# Patient Record
Sex: FEMALE | Employment: STUDENT | URBAN - METROPOLITAN AREA
[De-identification: names, ages, dates, MRNs, and addresses within clinical notes are randomized per-mention and may not be internally consistent; named-entity substitution may affect disease eponyms.]

---

## 2021-08-04 ENCOUNTER — TELEPHONE (OUTPATIENT)
Dept: DERMATOLOGY CLINIC | Facility: CLINIC | Age: 26
End: 2021-08-04

## 2021-08-04 ENCOUNTER — OFFICE VISIT (OUTPATIENT)
Dept: DERMATOLOGY CLINIC | Facility: CLINIC | Age: 26
End: 2021-08-04
Payer: COMMERCIAL

## 2021-08-04 DIAGNOSIS — L71.9 ROSACEA: Primary | ICD-10-CM

## 2021-08-04 PROCEDURE — 99203 OFFICE O/P NEW LOW 30 MIN: CPT | Performed by: PHYSICIAN ASSISTANT

## 2021-08-04 RX ORDER — METRONIDAZOLE 7.5 MG/G
1 GEL TOPICAL 2 TIMES DAILY
Qty: 45 G | Refills: 1 | Status: SHIPPED | OUTPATIENT
Start: 2021-08-04

## 2021-08-04 NOTE — TELEPHONE ENCOUNTER
Patient called    Asking to speak to RN about todays visit. Unclear of medications and what she should be doing.  Please call

## 2021-08-04 NOTE — TELEPHONE ENCOUNTER
Patient seen today- 8/4/21    How should she apply the [facility] and metronidazole on face? Apply at the same time BID?  Please advise

## 2021-08-04 NOTE — PROGRESS NOTES
HPI:    Patient ID: Sumi Boyd is a 22year old female. Patient presents with splotches on her face for the past 6 months. Rash is not itchy. No pain. No draining. She has not used any other products. Does not notice flushing.  Does have stress placed in this encounter. Meds This Visit:  Requested Prescriptions     Signed Prescriptions Disp Refills   • hydrocortisone 2.5 % External Ointment 30 g 0     Sig: Apply 1 Application topically 2 (two) times daily.  Use as needed   • metroNIDAZOLE 0.7

## 2021-08-05 NOTE — TELEPHONE ENCOUNTER
We went over this in the visit. She needs to use the hydrocortisone BID for 1 week at the same time as flagyl which is also BID. She needs stop hydrocortisone after 1 week and then continue with flagyl BID. Not improving in 2 weeks should let me know.